# Patient Record
Sex: MALE | Race: WHITE | NOT HISPANIC OR LATINO | Employment: FULL TIME | URBAN - METROPOLITAN AREA
[De-identification: names, ages, dates, MRNs, and addresses within clinical notes are randomized per-mention and may not be internally consistent; named-entity substitution may affect disease eponyms.]

---

## 2023-11-06 ENCOUNTER — HOSPITAL ENCOUNTER (OUTPATIENT)
Dept: CARDIOLOGY | Facility: CLINIC | Age: 68
Discharge: HOME | End: 2023-11-06
Payer: MEDICARE

## 2023-11-06 VITALS
SYSTOLIC BLOOD PRESSURE: 120 MMHG | WEIGHT: 236 LBS | HEIGHT: 72 IN | DIASTOLIC BLOOD PRESSURE: 80 MMHG | BODY MASS INDEX: 31.97 KG/M2

## 2023-11-06 DIAGNOSIS — I48.91 UNSPECIFIED ATRIAL FIBRILLATION (MULTI): ICD-10-CM

## 2023-11-06 LAB
AORTIC VALVE MEAN GRADIENT: 3.6
AORTIC VALVE PEAK VELOCITY: 1.2
AV PEAK GRADIENT: 5.7
AVA (PEAK VEL): 2.61
AVA (VTI): 2.46
EJECTION FRACTION APICAL 4 CHAMBER: 54.8
EJECTION FRACTION: 56
LEFT ATRIUM VOLUME AREA LENGTH INDEX BSA: 31
LEFT VENTRICLE INTERNAL DIMENSION DIASTOLE: 4.53 (ref 3.5–6)
LEFT VENTRICULAR OUTFLOW TRACT DIAMETER: 2.11
MITRAL VALVE E/A RATIO: 2.51
RIGHT VENTRICLE FREE WALL PEAK S': 0.07
RIGHT VENTRICLE PEAK SYSTOLIC PRESSURE: 29.2
TRICUSPID ANNULAR PLANE SYSTOLIC EXCURSION: 19

## 2023-11-06 PROCEDURE — 93306 TTE W/DOPPLER COMPLETE: CPT | Performed by: INTERNAL MEDICINE

## 2023-11-06 PROCEDURE — 93306 TTE W/DOPPLER COMPLETE: CPT

## 2023-12-01 PROBLEM — K40.30 INCARCERATED RIGHT INGUINAL HERNIA: Status: ACTIVE | Noted: 2023-12-01

## 2023-12-01 PROBLEM — I10 HYPERTENSION: Status: ACTIVE | Noted: 2023-05-24

## 2023-12-01 PROBLEM — H93.299 ABNORMAL AUDITORY PERCEPTION: Status: ACTIVE | Noted: 2023-12-01

## 2023-12-01 PROBLEM — N43.3 HYDROCELE: Status: ACTIVE | Noted: 2023-12-01

## 2023-12-01 PROBLEM — N48.83 ACQUIRED BURIED PENIS: Status: ACTIVE | Noted: 2023-12-01

## 2023-12-01 PROBLEM — R94.39 ABNORMAL STRESS TEST: Status: ACTIVE | Noted: 2023-12-01

## 2023-12-01 PROBLEM — E78.5 HYPERLIPIDEMIA: Status: ACTIVE | Noted: 2023-07-13

## 2023-12-01 PROBLEM — H93.13 TINNITUS OF BOTH EARS: Status: ACTIVE | Noted: 2023-12-01

## 2023-12-01 PROBLEM — N50.89 SWELLING OF SCROTUM PRESENT ON EXAMINATION: Status: ACTIVE | Noted: 2023-12-01

## 2023-12-01 PROBLEM — M25.569 KNEE PAIN: Status: ACTIVE | Noted: 2023-12-01

## 2023-12-01 PROBLEM — I48.91 ATRIAL FIBRILLATION (MULTI): Status: ACTIVE | Noted: 2023-05-24

## 2023-12-01 PROBLEM — N18.30 STAGE 3 CHRONIC KIDNEY DISEASE (MULTI): Status: ACTIVE | Noted: 2023-05-24

## 2023-12-01 PROBLEM — Q55.20: Status: ACTIVE | Noted: 2023-12-01

## 2023-12-01 PROBLEM — D17.6 LIPOMA, SPERMATIC CORD: Status: ACTIVE | Noted: 2023-12-01

## 2023-12-01 PROBLEM — H90.3 SENSORINEURAL HEARING LOSS (SNHL) OF BOTH EARS: Status: ACTIVE | Noted: 2023-12-01

## 2023-12-01 PROBLEM — R00.0 TACHYCARDIA: Status: ACTIVE | Noted: 2023-12-01

## 2023-12-01 PROBLEM — E66.812 CLASS 2 OBESITY: Status: ACTIVE | Noted: 2023-05-24

## 2023-12-01 PROBLEM — E66.9 CLASS 2 OBESITY: Status: ACTIVE | Noted: 2023-05-24

## 2023-12-01 PROBLEM — R03.0 ELEVATED BP WITHOUT DIAGNOSIS OF HYPERTENSION: Status: ACTIVE | Noted: 2023-12-01

## 2023-12-01 PROBLEM — K40.31: Status: ACTIVE | Noted: 2023-12-01

## 2023-12-04 ENCOUNTER — OFFICE VISIT (OUTPATIENT)
Dept: CARDIOLOGY | Facility: CLINIC | Age: 68
End: 2023-12-04
Payer: MEDICARE

## 2023-12-04 VITALS
WEIGHT: 244 LBS | HEART RATE: 65 BPM | DIASTOLIC BLOOD PRESSURE: 80 MMHG | BODY MASS INDEX: 33.05 KG/M2 | HEIGHT: 72 IN | SYSTOLIC BLOOD PRESSURE: 124 MMHG | OXYGEN SATURATION: 98 %

## 2023-12-04 DIAGNOSIS — E66.9 CLASS 1 OBESITY WITHOUT SERIOUS COMORBIDITY WITH BODY MASS INDEX (BMI) OF 33.0 TO 33.9 IN ADULT, UNSPECIFIED OBESITY TYPE: ICD-10-CM

## 2023-12-04 DIAGNOSIS — I10 HYPERTENSION, UNSPECIFIED TYPE: ICD-10-CM

## 2023-12-04 DIAGNOSIS — I48.21 PERMANENT ATRIAL FIBRILLATION (MULTI): Primary | ICD-10-CM

## 2023-12-04 DIAGNOSIS — E78.00 PURE HYPERCHOLESTEROLEMIA: ICD-10-CM

## 2023-12-04 PROCEDURE — 1126F AMNT PAIN NOTED NONE PRSNT: CPT | Performed by: INTERNAL MEDICINE

## 2023-12-04 PROCEDURE — 99214 OFFICE O/P EST MOD 30 MIN: CPT | Performed by: INTERNAL MEDICINE

## 2023-12-04 PROCEDURE — 3079F DIAST BP 80-89 MM HG: CPT | Performed by: INTERNAL MEDICINE

## 2023-12-04 PROCEDURE — 1160F RVW MEDS BY RX/DR IN RCRD: CPT | Performed by: INTERNAL MEDICINE

## 2023-12-04 PROCEDURE — 3008F BODY MASS INDEX DOCD: CPT | Performed by: INTERNAL MEDICINE

## 2023-12-04 PROCEDURE — 1036F TOBACCO NON-USER: CPT | Performed by: INTERNAL MEDICINE

## 2023-12-04 PROCEDURE — 1159F MED LIST DOCD IN RCRD: CPT | Performed by: INTERNAL MEDICINE

## 2023-12-04 PROCEDURE — 93000 ELECTROCARDIOGRAM COMPLETE: CPT | Performed by: INTERNAL MEDICINE

## 2023-12-04 PROCEDURE — 3074F SYST BP LT 130 MM HG: CPT | Performed by: INTERNAL MEDICINE

## 2023-12-04 RX ORDER — RIVAROXABAN 15 MG/1
15 TABLET, FILM COATED ORAL DAILY
COMMUNITY
End: 2023-12-27 | Stop reason: SDUPTHER

## 2023-12-04 RX ORDER — AMLODIPINE BESYLATE 5 MG/1
1 TABLET ORAL DAILY
COMMUNITY
Start: 2021-07-08 | End: 2024-03-05 | Stop reason: SDUPTHER

## 2023-12-04 RX ORDER — METOPROLOL TARTRATE 50 MG/1
1 TABLET ORAL DAILY
COMMUNITY

## 2023-12-04 NOTE — ASSESSMENT & PLAN NOTE
6/17/21 Holter Afib with 3.1 sec, pause; 7/2/21 echocardiogram LVEF = 55%,, 7/2/21 stress thallium abnomral ST response with normal perfusion scan LEF = 60%; 12/8/23 echocardiogram LVEF = 60-65%, unremarkable valves and TR with normal RVSP,  MARIANNE cardioversion declined at 12/15/22visit

## 2023-12-04 NOTE — PROGRESS NOTES
Subjective  Leon Berg  is a 68 y.o. year old male who presents for F/U persistent atrial fibrillation.  No chest pain, no dyspnea, no palapitions, no edema    Blood pressure 124/80, pulse 65, height 1.829 m (6'), weight 111 kg (244 lb), SpO2 98 %.   Patient has no known allergies.  History reviewed. No pertinent past medical history.  Past Surgical History:   Procedure Laterality Date    HERNIA REPAIR  09/01/2021    Hernia Repair    OTHER SURGICAL HISTORY  05/26/2021    Colonoscopy     No family history on file.  @SOC    Current Outpatient Medications   Medication Sig Dispense Refill    amLODIPine (Norvasc) 5 mg tablet Take 1 tablet (5 mg) by mouth once daily.      metoprolol tartrate (Lopressor) 50 mg tablet Take 1 tablet by mouth once daily.      Xarelto 15 mg tablet Take 1 tablet (15 mg) by mouth once daily.       No current facility-administered medications for this visit.        ROS  Review of Systems   All other systems reviewed and are negative.      Physical Exam  Physical Exam  Constitutional:       Appearance: He is obese.   HENT:      Head: Normocephalic and atraumatic.   Eyes:      Extraocular Movements: Extraocular movements intact.      Pupils: Pupils are equal, round, and reactive to light.   Cardiovascular:      Rate and Rhythm: Rhythm irregular.      Heart sounds: S1 normal and S2 normal.   Abdominal:      Palpations: Abdomen is soft.   Neurological:      Mental Status: He is alert.   Psychiatric:         Mood and Affect: Mood normal.          EKG  Encounter Date: 12/04/23   ECG 12 Lead    Narrative    Agtrial fibrillatation at 65/min.,        Problem List Items Addressed This Visit       Atrial fibrillation (CMS/[facility]) - Primary     6/17/21 Holter Afib with 3.1 sec, pause; 7/2/21 echocardiogram LVEF = 55%,, 7/2/21 stress thallium abnomral ST response with normal perfusion scan LEF = 60%         Relevant Medications    metoprolol tartrate (Lopressor) 50 mg tablet    amLODIPine (Norvasc) 5 mg  tablet    Other Relevant Orders    Follow Up In Cardiology    Obesity    Hyperlipidemia    Hypertension    Relevant Orders    ECG 12 Lead (Completed)         No follow-ups on file.      Leon Acosta MD

## 2023-12-27 DIAGNOSIS — I48.21 PERMANENT ATRIAL FIBRILLATION (MULTI): Primary | ICD-10-CM

## 2024-03-05 ENCOUNTER — OFFICE VISIT (OUTPATIENT)
Dept: CARDIOLOGY | Facility: CLINIC | Age: 69
End: 2024-03-05
Payer: MEDICARE

## 2024-03-05 VITALS
OXYGEN SATURATION: 97 % | HEART RATE: 75 BPM | HEIGHT: 72 IN | DIASTOLIC BLOOD PRESSURE: 74 MMHG | BODY MASS INDEX: 32.37 KG/M2 | WEIGHT: 239 LBS | SYSTOLIC BLOOD PRESSURE: 124 MMHG

## 2024-03-05 DIAGNOSIS — I10 PRIMARY HYPERTENSION: Primary | ICD-10-CM

## 2024-03-05 DIAGNOSIS — I48.21 PERMANENT ATRIAL FIBRILLATION (MULTI): Primary | ICD-10-CM

## 2024-03-05 DIAGNOSIS — N18.30 STAGE 3 CHRONIC KIDNEY DISEASE, UNSPECIFIED WHETHER STAGE 3A OR 3B CKD (MULTI): ICD-10-CM

## 2024-03-05 DIAGNOSIS — E66.9 CLASS 1 OBESITY WITHOUT SERIOUS COMORBIDITY WITH BODY MASS INDEX (BMI) OF 32.0 TO 32.9 IN ADULT, UNSPECIFIED OBESITY TYPE: ICD-10-CM

## 2024-03-05 DIAGNOSIS — I10 PRIMARY HYPERTENSION: ICD-10-CM

## 2024-03-05 DIAGNOSIS — E78.00 PURE HYPERCHOLESTEROLEMIA: ICD-10-CM

## 2024-03-05 PROCEDURE — 1159F MED LIST DOCD IN RCRD: CPT | Performed by: INTERNAL MEDICINE

## 2024-03-05 PROCEDURE — 3008F BODY MASS INDEX DOCD: CPT | Performed by: INTERNAL MEDICINE

## 2024-03-05 PROCEDURE — 1160F RVW MEDS BY RX/DR IN RCRD: CPT | Performed by: INTERNAL MEDICINE

## 2024-03-05 PROCEDURE — 3074F SYST BP LT 130 MM HG: CPT | Performed by: INTERNAL MEDICINE

## 2024-03-05 PROCEDURE — 1036F TOBACCO NON-USER: CPT | Performed by: INTERNAL MEDICINE

## 2024-03-05 PROCEDURE — 1126F AMNT PAIN NOTED NONE PRSNT: CPT | Performed by: INTERNAL MEDICINE

## 2024-03-05 PROCEDURE — 93000 ELECTROCARDIOGRAM COMPLETE: CPT | Performed by: INTERNAL MEDICINE

## 2024-03-05 PROCEDURE — 99214 OFFICE O/P EST MOD 30 MIN: CPT | Performed by: INTERNAL MEDICINE

## 2024-03-05 PROCEDURE — 3078F DIAST BP <80 MM HG: CPT | Performed by: INTERNAL MEDICINE

## 2024-03-05 RX ORDER — AMLODIPINE BESYLATE 5 MG/1
5 TABLET ORAL DAILY
Qty: 90 TABLET | Refills: 3 | Status: SHIPPED | OUTPATIENT
Start: 2024-03-05

## 2024-03-05 NOTE — ASSESSMENT & PLAN NOTE
9/22 23 echocardiogram LVEF = 55-60%, unremarkable valves, 7/2/21 sress thallium abnormal ST response with normal scan LVEF = 60%, 6/17/21 Holter  Afib with 3.1 sec. ause

## 2024-03-05 NOTE — PROGRESS NOTES
Subjective  Leon Berg  is a 68 y.o. year old male who presents for Persistent atrial fibrillation.  No chest pain, no dyhspnea, no palpitation, no edema    Blood pressure 124/74, pulse 75, height 1.829 m (6'), weight 108 kg (239 lb), SpO2 97 %.   Patient has no known allergies.  History reviewed. No pertinent past medical history.  Past Surgical History:   Procedure Laterality Date    HERNIA REPAIR  09/01/2021    Hernia Repair    OTHER SURGICAL HISTORY  05/26/2021    Colonoscopy     No family history on file.  @SOC    Current Outpatient Medications   Medication Sig Dispense Refill    amLODIPine (Norvasc) 5 mg tablet Take 1 tablet (5 mg) by mouth once daily.      metoprolol tartrate (Lopressor) 50 mg tablet Take 1 tablet by mouth once daily.      rivaroxaban (Xarelto) 15 mg tablet Take 1 tablet (15 mg) by mouth once daily. 90 tablet 1     No current facility-administered medications for this visit.        ROS  Review of Systems   All other systems reviewed and are negative.      Physical Exam  Physical Exam  Constitutional:       Appearance: He is obese.   HENT:      Head: Normocephalic and atraumatic.   Eyes:      Extraocular Movements: Extraocular movements intact.      Pupils: Pupils are equal, round, and reactive to light.   Cardiovascular:      Rate and Rhythm: Normal rate. Rhythm irregularly irregular.      Heart sounds: S1 normal and S2 normal.   Abdominal:      Palpations: Abdomen is soft.   Musculoskeletal:      Right lower leg: No edema.      Left lower leg: No edema.   Skin:     General: Skin is warm and dry.   Neurological:      General: No focal deficit present.      Mental Status: He is alert and oriented to person, place, and time.   Psychiatric:         Mood and Affect: Mood normal.         Behavior: Behavior normal.          EKG  Encounter Date: 03/05/24   ECG 12 Lead    Narrative    Atrial fibrillation at 67/min.,       Problem List Items Addressed This Visit       Atrial fibrillation  (CMS/[facility]) - Primary     9/22 23 echocardiogram LVEF = 55-60%, unremarkable valves, 7/2/21 sress thallium abnormal ST response with normal scan LVEF = 60%, 6/17/21 Holter  Afib with 3.1 sec. ause         Relevant Orders    ECG 12 Lead (Completed)    Follow Up In Cardiology    Obesity    Hyperlipidemia    Hypertension    Stage 3 chronic kidney disease (CMS/[facility])         Same meds with recheck 3 months follow up      Leon Acosta MD

## 2024-06-11 ENCOUNTER — APPOINTMENT (OUTPATIENT)
Dept: CARDIOLOGY | Facility: CLINIC | Age: 69
End: 2024-06-11
Payer: MEDICARE

## 2024-06-20 ENCOUNTER — APPOINTMENT (OUTPATIENT)
Dept: CARDIOLOGY | Facility: CLINIC | Age: 69
End: 2024-06-20
Payer: MEDICARE

## 2024-06-25 DIAGNOSIS — I48.21 PERMANENT ATRIAL FIBRILLATION (MULTI): Primary | ICD-10-CM

## 2024-06-25 RX ORDER — RIVAROXABAN 15 MG/1
15 TABLET, FILM COATED ORAL DAILY
Qty: 90 TABLET | Refills: 3 | Status: SHIPPED | OUTPATIENT
Start: 2024-06-25

## 2024-07-18 PROBLEM — K40.31: Status: RESOLVED | Noted: 2023-12-01 | Resolved: 2024-07-18

## 2024-07-18 PROBLEM — N48.83 ACQUIRED BURIED PENIS: Status: RESOLVED | Noted: 2023-12-01 | Resolved: 2024-07-18

## 2024-07-18 PROBLEM — K40.30 INCARCERATED RIGHT INGUINAL HERNIA: Status: RESOLVED | Noted: 2023-12-01 | Resolved: 2024-07-18

## 2024-07-18 PROBLEM — Q55.20: Status: RESOLVED | Noted: 2023-12-01 | Resolved: 2024-07-18

## 2024-07-18 PROBLEM — D17.6 LIPOMA, SPERMATIC CORD: Status: RESOLVED | Noted: 2023-12-01 | Resolved: 2024-07-18

## 2024-07-18 PROBLEM — E66.811 CLASS 1 OBESITY WITH BODY MASS INDEX (BMI) OF 32.0 TO 32.9 IN ADULT: Status: ACTIVE | Noted: 2023-05-24

## 2024-07-18 PROBLEM — N43.3 HYDROCELE: Status: RESOLVED | Noted: 2023-12-01 | Resolved: 2024-07-18

## 2024-07-18 PROBLEM — N50.89 SWELLING OF SCROTUM PRESENT ON EXAMINATION: Status: RESOLVED | Noted: 2023-12-01 | Resolved: 2024-07-18

## 2024-08-05 ENCOUNTER — APPOINTMENT (OUTPATIENT)
Dept: CARDIOLOGY | Facility: CLINIC | Age: 69
End: 2024-08-05
Payer: MEDICARE

## 2024-08-05 VITALS
OXYGEN SATURATION: 97 % | HEIGHT: 72 IN | WEIGHT: 238 LBS | SYSTOLIC BLOOD PRESSURE: 120 MMHG | DIASTOLIC BLOOD PRESSURE: 72 MMHG | HEART RATE: 77 BPM | BODY MASS INDEX: 32.23 KG/M2

## 2024-08-05 DIAGNOSIS — E66.9 CLASS 1 OBESITY WITHOUT SERIOUS COMORBIDITY WITH BODY MASS INDEX (BMI) OF 32.0 TO 32.9 IN ADULT, UNSPECIFIED OBESITY TYPE: ICD-10-CM

## 2024-08-05 DIAGNOSIS — I10 PRIMARY HYPERTENSION: ICD-10-CM

## 2024-08-05 DIAGNOSIS — N18.30 STAGE 3 CHRONIC KIDNEY DISEASE, UNSPECIFIED WHETHER STAGE 3A OR 3B CKD (MULTI): ICD-10-CM

## 2024-08-05 DIAGNOSIS — I48.21 PERMANENT ATRIAL FIBRILLATION (MULTI): Primary | ICD-10-CM

## 2024-08-05 PROCEDURE — 1159F MED LIST DOCD IN RCRD: CPT | Performed by: INTERNAL MEDICINE

## 2024-08-05 PROCEDURE — 93010 ELECTROCARDIOGRAM REPORT: CPT | Performed by: INTERNAL MEDICINE

## 2024-08-05 PROCEDURE — 1160F RVW MEDS BY RX/DR IN RCRD: CPT | Performed by: INTERNAL MEDICINE

## 2024-08-05 PROCEDURE — 3074F SYST BP LT 130 MM HG: CPT | Performed by: INTERNAL MEDICINE

## 2024-08-05 PROCEDURE — 3078F DIAST BP <80 MM HG: CPT | Performed by: INTERNAL MEDICINE

## 2024-08-05 PROCEDURE — 1036F TOBACCO NON-USER: CPT | Performed by: INTERNAL MEDICINE

## 2024-08-05 PROCEDURE — 3008F BODY MASS INDEX DOCD: CPT | Performed by: INTERNAL MEDICINE

## 2024-08-05 PROCEDURE — 99214 OFFICE O/P EST MOD 30 MIN: CPT | Performed by: INTERNAL MEDICINE

## 2024-08-05 NOTE — ASSESSMENT & PLAN NOTE
9/22/23 echocardiogram LVEf = 55-60%, unremarkable valves. 7/21/21 stress thallium abnormal ST resposn with normal scan LVEF = 60%, 6/17/21 Holter afib with 3.1 sec. Pause.  Refuses cardioversion

## 2024-08-05 NOTE — PROGRESS NOTES
Subjective  Leon Berg  is a 68 y.o. year old male who presents for persistent atrial fibrillation.  No chest pain, no dyspnea, no palpitations, no edema    Blood pressure 120/72, pulse 77, height 1.829 m (6'), weight 108 kg (238 lb), SpO2 97%.   Patient has no known allergies.  History reviewed. No pertinent past medical history.  Past Surgical History:   Procedure Laterality Date    HERNIA REPAIR  09/01/2021    Hernia Repair    OTHER SURGICAL HISTORY  05/26/2021    Colonoscopy     No family history on file.  @SOC    Current Outpatient Medications   Medication Sig Dispense Refill    amLODIPine (Norvasc) 5 mg tablet Take 1 tablet (5 mg) by mouth once daily. 90 tablet 3    metoprolol tartrate (Lopressor) 50 mg tablet Take 1 tablet by mouth once daily.      Xarelto 15 mg tablet TAKE 1 TABLET(15 MG) BY MOUTH EVERY DAY 90 tablet 3     No current facility-administered medications for this visit.        ROS  Review of Systems   All other systems reviewed and are negative.      Physical Exam  Physical Exam  Constitutional:       Appearance: He is obese.   HENT:      Head: Normocephalic and atraumatic.   Cardiovascular:      Rate and Rhythm: Rhythm irregularly irregular.   Pulmonary:      Effort: Pulmonary effort is normal.      Breath sounds: Normal breath sounds.   Abdominal:      Palpations: Abdomen is soft.   Skin:     General: Skin is warm and dry.   Neurological:      General: No focal deficit present.      Mental Status: He is alert and oriented to person, place, and time.   Psychiatric:         Mood and Affect: Mood normal.         Behavior: Behavior normal.          EKG  Encounter Date: 03/05/24   ECG 12 Lead    Narrative    Atrial fibrillation at 67/min.,       Problem List Items Addressed This Visit       Atrial fibrillation (Multi) - Primary     9/22/23 echocardiogram LVEf = 55-60%, unremarkable valves. 7/21/21 stress thallium abnormal ST resposn with normal scan LVEF = 60%, 6/17/21 Holter afib with 3.1  sec. Pause.  Refuses cardioversion         Class 1 obesity with body mass index (BMI) of 32.0 to 32.9 in adult    Hypertension    Relevant Orders    ECG 12 Lead    Stage 3 chronic kidney disease (Multi)         Return 3 months  with echocardiogram      Leon Acosta MD

## 2024-08-27 DIAGNOSIS — I10 PRIMARY HYPERTENSION: Primary | ICD-10-CM

## 2024-08-27 RX ORDER — METOPROLOL SUCCINATE 50 MG/1
50 TABLET, EXTENDED RELEASE ORAL DAILY
COMMUNITY
End: 2024-08-27 | Stop reason: SDUPTHER

## 2024-08-27 RX ORDER — METOPROLOL SUCCINATE 50 MG/1
50 TABLET, EXTENDED RELEASE ORAL DAILY
Qty: 90 TABLET | Refills: 3 | Status: SHIPPED | OUTPATIENT
Start: 2024-08-27

## 2024-10-22 PROBLEM — M25.569 KNEE PAIN: Status: RESOLVED | Noted: 2023-12-01 | Resolved: 2024-10-22

## 2024-11-05 ENCOUNTER — HOSPITAL ENCOUNTER (OUTPATIENT)
Dept: CARDIOLOGY | Facility: CLINIC | Age: 69
Discharge: HOME | End: 2024-11-05
Payer: MEDICARE

## 2024-11-05 DIAGNOSIS — I48.21 PERMANENT ATRIAL FIBRILLATION (MULTI): ICD-10-CM

## 2024-11-05 DIAGNOSIS — I10 ESSENTIAL (PRIMARY) HYPERTENSION: ICD-10-CM

## 2024-11-05 LAB
AORTIC VALVE MEAN GRADIENT: 4 MMHG
AORTIC VALVE PEAK VELOCITY: 1.2 M/S
AV PEAK GRADIENT: 6 MMHG
AVA (PEAK VEL): 2.32 CM2
AVA (VTI): 2.75 CM2
EJECTION FRACTION APICAL 4 CHAMBER: 58.4
EJECTION FRACTION: 58 %
LEFT ATRIUM VOLUME AREA LENGTH INDEX BSA: 24.5 ML/M2
LEFT VENTRICLE INTERNAL DIMENSION DIASTOLE: 4.26 CM (ref 3.5–6)
LEFT VENTRICULAR OUTFLOW TRACT DIAMETER: 2 CM
RIGHT VENTRICLE FREE WALL PEAK S': 0.13 CM/S
RIGHT VENTRICLE PEAK SYSTOLIC PRESSURE: 27.5 MMHG
TRICUSPID ANNULAR PLANE SYSTOLIC EXCURSION: 2.4 CM

## 2024-11-05 PROCEDURE — 93306 TTE W/DOPPLER COMPLETE: CPT | Performed by: INTERNAL MEDICINE

## 2024-11-05 PROCEDURE — 93306 TTE W/DOPPLER COMPLETE: CPT

## 2024-11-11 ENCOUNTER — OFFICE VISIT (OUTPATIENT)
Dept: CARDIOLOGY | Facility: CLINIC | Age: 69
End: 2024-11-11
Payer: MEDICARE

## 2024-11-11 VITALS
SYSTOLIC BLOOD PRESSURE: 118 MMHG | HEART RATE: 78 BPM | WEIGHT: 241 LBS | HEIGHT: 72 IN | BODY MASS INDEX: 32.64 KG/M2 | DIASTOLIC BLOOD PRESSURE: 72 MMHG | OXYGEN SATURATION: 96 %

## 2024-11-11 DIAGNOSIS — I48.21 PERMANENT ATRIAL FIBRILLATION (MULTI): Primary | ICD-10-CM

## 2024-11-11 DIAGNOSIS — E78.00 PURE HYPERCHOLESTEROLEMIA: ICD-10-CM

## 2024-11-11 DIAGNOSIS — I10 PRIMARY HYPERTENSION: ICD-10-CM

## 2024-11-11 DIAGNOSIS — E66.811 CLASS 1 OBESITY WITHOUT SERIOUS COMORBIDITY WITH BODY MASS INDEX (BMI) OF 32.0 TO 32.9 IN ADULT, UNSPECIFIED OBESITY TYPE: ICD-10-CM

## 2024-11-11 DIAGNOSIS — N18.30 STAGE 3 CHRONIC KIDNEY DISEASE, UNSPECIFIED WHETHER STAGE 3A OR 3B CKD (MULTI): ICD-10-CM

## 2024-11-11 LAB
Q ONSET: 222 MS
QRS COUNT: 12 BEATS
QRS DURATION: 80 MS
QT INTERVAL: 370 MS
QTC CALCULATION(BAZETT): 393 MS
QTC FREDERICIA: 386 MS
R AXIS: 43 DEGREES
T AXIS: 26 DEGREES
T OFFSET: 407 MS
VENTRICULAR RATE: 68 BPM

## 2024-11-11 PROCEDURE — 93005 ELECTROCARDIOGRAM TRACING: CPT | Performed by: INTERNAL MEDICINE

## 2024-11-11 PROCEDURE — 1159F MED LIST DOCD IN RCRD: CPT | Performed by: INTERNAL MEDICINE

## 2024-11-11 PROCEDURE — 99214 OFFICE O/P EST MOD 30 MIN: CPT | Mod: 25 | Performed by: INTERNAL MEDICINE

## 2024-11-11 PROCEDURE — 3078F DIAST BP <80 MM HG: CPT | Performed by: INTERNAL MEDICINE

## 2024-11-11 PROCEDURE — 1160F RVW MEDS BY RX/DR IN RCRD: CPT | Performed by: INTERNAL MEDICINE

## 2024-11-11 PROCEDURE — 99214 OFFICE O/P EST MOD 30 MIN: CPT | Performed by: INTERNAL MEDICINE

## 2024-11-11 PROCEDURE — 1036F TOBACCO NON-USER: CPT | Performed by: INTERNAL MEDICINE

## 2024-11-11 PROCEDURE — 3074F SYST BP LT 130 MM HG: CPT | Performed by: INTERNAL MEDICINE

## 2024-11-11 PROCEDURE — 3008F BODY MASS INDEX DOCD: CPT | Performed by: INTERNAL MEDICINE

## 2024-11-11 PROCEDURE — 93010 ELECTROCARDIOGRAM REPORT: CPT | Performed by: INTERNAL MEDICINE

## 2024-11-11 NOTE — PROGRESS NOTES
Subjective  Leon Berg  is a 69 y.o. year old male who presents for F/U atrial fibrillation.  No chest pain, no dyspnea, no palpitations, no edema.    Blood pressure 118/72, pulse 78, height 1.829 m (6'), weight 109 kg (241 lb), SpO2 96%.   Patient has no known allergies.  History reviewed. No pertinent past medical history.  Past Surgical History:   Procedure Laterality Date    HERNIA REPAIR  09/01/2021    Hernia Repair    OTHER SURGICAL HISTORY  05/26/2021    Colonoscopy     No family history on file.  @SOC    Current Outpatient Medications   Medication Sig Dispense Refill    amLODIPine (Norvasc) 5 mg tablet Take 1 tablet (5 mg) by mouth once daily. 90 tablet 3    metoprolol succinate XL (Toprol-XL) 50 mg 24 hr tablet Take 1 tablet (50 mg) by mouth once daily. 90 tablet 3    Xarelto 15 mg tablet TAKE 1 TABLET(15 MG) BY MOUTH EVERY DAY 90 tablet 3     No current facility-administered medications for this visit.        ROS  Review of Systems   All other systems reviewed and are negative.      Physical Exam  Physical Exam  Constitutional:       Appearance: He is obese.   HENT:      Head: Normocephalic and atraumatic.   Cardiovascular:      Rate and Rhythm: Normal rate. Rhythm irregularly irregular.   Pulmonary:      Effort: Pulmonary effort is normal.      Breath sounds: Normal breath sounds.   Abdominal:      Palpations: Abdomen is soft.   Skin:     General: Skin is warm and dry.   Neurological:      General: No focal deficit present.      Mental Status: He is alert.   Psychiatric:         Mood and Affect: Mood normal.          EKG  Encounter Date: 08/05/24   ECG 12 Lead    Narrative    Atrial fibrillation       Problem List Items Addressed This Visit       Atrial fibrillation (Multi) - Primary     Refuses carioversion.  7/21/21 stress thallium abnormal ST response with normal scan LVEF = 60%. 11/5/24 echocardiogram  LVEf = 55-60%, mild LA, TR with normal RVSP, unremarkable valves         Relevant Orders     ECG 12 Lead    Class 1 obesity with body mass index (BMI) of 32.0 to 32.9 in adult    Hyperlipidemia    Hypertension    Stage 3 chronic kidney disease (Multi)         Same meds with return 3 months with EKG      Leon Acosta MD

## 2024-11-11 NOTE — ASSESSMENT & PLAN NOTE
Refuses carioversion.  7/21/21 stress thallium abnormal ST response with normal scan LVEF = 60%. 11/5/24 echocardiogram  LVEf = 55-60%, mild LA, TR with normal RVSP, unremarkable valves

## 2025-02-11 ENCOUNTER — OFFICE VISIT (OUTPATIENT)
Dept: CARDIOLOGY | Facility: CLINIC | Age: 70
End: 2025-02-11
Payer: MEDICARE

## 2025-02-11 VITALS
BODY MASS INDEX: 33.46 KG/M2 | HEART RATE: 74 BPM | DIASTOLIC BLOOD PRESSURE: 80 MMHG | HEIGHT: 72 IN | WEIGHT: 247 LBS | OXYGEN SATURATION: 98 % | SYSTOLIC BLOOD PRESSURE: 120 MMHG

## 2025-02-11 DIAGNOSIS — I10 PRIMARY HYPERTENSION: ICD-10-CM

## 2025-02-11 DIAGNOSIS — N18.30 STAGE 3 CHRONIC KIDNEY DISEASE, UNSPECIFIED WHETHER STAGE 3A OR 3B CKD (MULTI): ICD-10-CM

## 2025-02-11 DIAGNOSIS — E66.811 CLASS 1 OBESITY WITHOUT SERIOUS COMORBIDITY WITH BODY MASS INDEX (BMI) OF 32.0 TO 32.9 IN ADULT, UNSPECIFIED OBESITY TYPE: ICD-10-CM

## 2025-02-11 DIAGNOSIS — E78.00 PURE HYPERCHOLESTEROLEMIA: ICD-10-CM

## 2025-02-11 DIAGNOSIS — I48.21 PERMANENT ATRIAL FIBRILLATION (MULTI): ICD-10-CM

## 2025-02-11 DIAGNOSIS — I48.21 PERMANENT ATRIAL FIBRILLATION (MULTI): Primary | ICD-10-CM

## 2025-02-11 PROCEDURE — 3074F SYST BP LT 130 MM HG: CPT | Performed by: INTERNAL MEDICINE

## 2025-02-11 PROCEDURE — 93010 ELECTROCARDIOGRAM REPORT: CPT | Performed by: INTERNAL MEDICINE

## 2025-02-11 PROCEDURE — 99214 OFFICE O/P EST MOD 30 MIN: CPT | Performed by: INTERNAL MEDICINE

## 2025-02-11 PROCEDURE — 93005 ELECTROCARDIOGRAM TRACING: CPT | Performed by: INTERNAL MEDICINE

## 2025-02-11 PROCEDURE — 3079F DIAST BP 80-89 MM HG: CPT | Performed by: INTERNAL MEDICINE

## 2025-02-11 PROCEDURE — 3008F BODY MASS INDEX DOCD: CPT | Performed by: INTERNAL MEDICINE

## 2025-02-11 PROCEDURE — 99214 OFFICE O/P EST MOD 30 MIN: CPT | Mod: 25 | Performed by: INTERNAL MEDICINE

## 2025-02-11 NOTE — PROGRESS NOTES
Subjective  Leon Berg  is a 69 y.o. year old male who presents for paroxyaml atrial fibrillation.  Xarelto cost increased significantly.  No chest pain, no palpitatios, no edema.      Blood pressure 120/80, pulse 74, height 1.829 m (6'), weight 112 kg (247 lb), SpO2 98%.   Patient has no known allergies.  No past medical history on file.  Past Surgical History:   Procedure Laterality Date    HERNIA REPAIR  09/01/2021    Hernia Repair    OTHER SURGICAL HISTORY  05/26/2021    Colonoscopy     No family history on file.  @SOC    Current Outpatient Medications   Medication Sig Dispense Refill    amLODIPine (Norvasc) 5 mg tablet Take 1 tablet (5 mg) by mouth once daily. 90 tablet 3    metoprolol succinate XL (Toprol-XL) 50 mg 24 hr tablet Take 1 tablet (50 mg) by mouth once daily. 90 tablet 3    Xarelto 15 mg tablet TAKE 1 TABLET(15 MG) BY MOUTH EVERY DAY 90 tablet 3     No current facility-administered medications for this visit.        ROS  Review of Systems   All other systems reviewed and are negative.      Physical Exam  Physical Exam  Constitutional:       Appearance: He is obese.   HENT:      Head: Normocephalic and atraumatic.   Cardiovascular:      Rate and Rhythm: Normal rate. Rhythm irregularly irregular.   Pulmonary:      Effort: Pulmonary effort is normal.      Breath sounds: Normal breath sounds.   Abdominal:      Palpations: Abdomen is soft.   Musculoskeletal:      Right lower leg: No edema.      Left lower leg: No edema.   Skin:     General: Skin is warm and dry.   Neurological:      General: No focal deficit present.      Mental Status: He is alert and oriented to person, place, and time.   Psychiatric:         Mood and Affect: Mood normal.         Behavior: Behavior normal.          EKG  Encounter Date: 02/11/25   ECG 12 Lead   Result Value    Ventricular Rate 60    QRS Duration 84    QT Interval 390    QTC Calculation(Bazett) 390    R Axis 40    T Axis 39    QRS Count 10    Q Onset 221    T  Offset 416    QTC Fredericia 390    Narrative    Atrial fibrillation  Septal infarct (cited on or before 11-NOV-2024)  Abnormal ECG  When compared with ECG of 11-NOV-2024 10:26,  No significant change was found       Problem List Items Addressed This Visit       Atrial fibrillation (Multi) - Primary     Refuses cardioversion, 7/21/21 stress thallium abnormal ST response with normal scan and LVEF = 60%. 11/5/24 echocardiogram LVEf = 55-60%, mild LAE, Tr with normal RVSP, unremarkable valves         Relevant Orders    ECG 12 Lead (Completed)    Class 1 obesity with body mass index (BMI) of 32.0 to 32.9 in adult    Hyperlipidemia    Hypertension    Stage 3 chronic kidney disease (Multi)         Refer to specialty pharmacy  Return 3 months with EKG      Leon Acosta MD

## 2025-02-11 NOTE — ASSESSMENT & PLAN NOTE
Refuses cardioversion, 7/21/21 stress thallium abnormal ST response with normal scan and LVEF = 60%. 11/5/24 echocardiogram LVEf = 55-60%, mild LAE, Tr with normal RVSP, unremarkable valves

## 2025-02-12 ENCOUNTER — SPECIALTY PHARMACY (OUTPATIENT)
Dept: PHARMACY | Facility: CLINIC | Age: 70
End: 2025-02-12

## 2025-02-12 LAB
Q ONSET: 221 MS
QRS COUNT: 10 BEATS
QRS DURATION: 84 MS
QT INTERVAL: 390 MS
QTC CALCULATION(BAZETT): 390 MS
QTC FREDERICIA: 390 MS
R AXIS: 40 DEGREES
T AXIS: 39 DEGREES
T OFFSET: 416 MS
VENTRICULAR RATE: 60 BPM

## 2025-02-26 ENCOUNTER — SPECIALTY PHARMACY (OUTPATIENT)
Dept: PHARMACY | Facility: CLINIC | Age: 70
End: 2025-02-26

## 2025-02-26 ENCOUNTER — PHARMACY VISIT (OUTPATIENT)
Dept: PHARMACY | Facility: CLINIC | Age: 70
End: 2025-02-26
Payer: COMMERCIAL

## 2025-02-26 PROCEDURE — RXMED WILLOW AMBULATORY MEDICATION CHARGE

## 2025-05-08 ENCOUNTER — OFFICE VISIT (OUTPATIENT)
Dept: CARDIOLOGY | Facility: CLINIC | Age: 70
End: 2025-05-08
Payer: MEDICARE

## 2025-05-08 VITALS
SYSTOLIC BLOOD PRESSURE: 130 MMHG | BODY MASS INDEX: 33.72 KG/M2 | OXYGEN SATURATION: 97 % | HEART RATE: 78 BPM | DIASTOLIC BLOOD PRESSURE: 80 MMHG | WEIGHT: 249 LBS | HEIGHT: 72 IN

## 2025-05-08 DIAGNOSIS — I48.21 PERMANENT ATRIAL FIBRILLATION (MULTI): Primary | ICD-10-CM

## 2025-05-08 DIAGNOSIS — N18.30 STAGE 3 CHRONIC KIDNEY DISEASE, UNSPECIFIED WHETHER STAGE 3A OR 3B CKD (MULTI): ICD-10-CM

## 2025-05-08 DIAGNOSIS — E78.00 PURE HYPERCHOLESTEROLEMIA: ICD-10-CM

## 2025-05-08 DIAGNOSIS — I10 PRIMARY HYPERTENSION: ICD-10-CM

## 2025-05-08 DIAGNOSIS — E66.811 CLASS 1 OBESITY WITHOUT SERIOUS COMORBIDITY WITH BODY MASS INDEX (BMI) OF 33.0 TO 33.9 IN ADULT, UNSPECIFIED OBESITY TYPE: ICD-10-CM

## 2025-05-08 LAB
Q ONSET: 219 MS
QRS COUNT: 11 BEATS
QRS DURATION: 84 MS
QT INTERVAL: 382 MS
QTC CALCULATION(BAZETT): 403 MS
QTC FREDERICIA: 396 MS
R AXIS: 33 DEGREES
T AXIS: 41 DEGREES
T OFFSET: 410 MS
VENTRICULAR RATE: 67 BPM

## 2025-05-08 PROCEDURE — 1036F TOBACCO NON-USER: CPT | Performed by: INTERNAL MEDICINE

## 2025-05-08 PROCEDURE — 3075F SYST BP GE 130 - 139MM HG: CPT | Performed by: INTERNAL MEDICINE

## 2025-05-08 PROCEDURE — 1159F MED LIST DOCD IN RCRD: CPT | Performed by: INTERNAL MEDICINE

## 2025-05-08 PROCEDURE — 99214 OFFICE O/P EST MOD 30 MIN: CPT | Performed by: INTERNAL MEDICINE

## 2025-05-08 PROCEDURE — 93005 ELECTROCARDIOGRAM TRACING: CPT | Performed by: INTERNAL MEDICINE

## 2025-05-08 PROCEDURE — 93010 ELECTROCARDIOGRAM REPORT: CPT | Performed by: INTERNAL MEDICINE

## 2025-05-08 PROCEDURE — 1160F RVW MEDS BY RX/DR IN RCRD: CPT | Performed by: INTERNAL MEDICINE

## 2025-05-08 PROCEDURE — 3079F DIAST BP 80-89 MM HG: CPT | Performed by: INTERNAL MEDICINE

## 2025-05-08 PROCEDURE — 3008F BODY MASS INDEX DOCD: CPT | Performed by: INTERNAL MEDICINE

## 2025-05-08 NOTE — ASSESSMENT & PLAN NOTE
Refuses cardioversion, 7/21/21 stress thallium abnormal ST response with normal scan and LVEf = 60%, 11/5/24 echocardiogram LVEF = 55-60%, mild LAE, TR with normal RVSP

## 2025-05-08 NOTE — PROGRESS NOTES
Subjective  Leon Berg  is a 69 y.o. year old male who presents for F/U afib.  No chest pain, no dyspnea, no palpitations, no edema. Doing yard work.    Blood pressure 130/80, pulse 78, height 1.829 m (6'), weight 113 kg (249 lb), SpO2 97%.   Patient has no known allergies.  Medical History[1]  Surgical History[2]  Family History[3]  @SOC    Current Medications[4]     ROS  Review of Systems   All other systems reviewed and are negative.      Physical Exam  Physical Exam  Constitutional:       Appearance: He is obese.   HENT:      Head: Normocephalic and atraumatic.   Cardiovascular:      Rate and Rhythm: Normal rate. Rhythm irregular.   Pulmonary:      Effort: Pulmonary effort is normal.      Breath sounds: Normal breath sounds.   Abdominal:      General: Abdomen is flat.   Skin:     General: Skin is warm and dry.   Neurological:      General: No focal deficit present.      Mental Status: He is alert and oriented to person, place, and time.   Psychiatric:         Mood and Affect: Mood normal.         Behavior: Behavior normal.     scan     EKG  Encounter Date: 05/08/25   ECG 12 Lead   Result Value    Ventricular Rate 67    QRS Duration 84    QT Interval 382    QTC Calculation(Bazett) 403    R Axis 33    T Axis 41    QRS Count 11    Q Onset 219    T Offset 410    QTC Fredericia 396    Narrative    Atrial fibrillation  Abnormal ECG  When compared with ECG of 11-FEB-2025 10:44,  Criteria for Septal infarct are no longer Present       Problem List Items Addressed This Visit       Atrial fibrillation (Multi) - Primary    Refuses cardioversion, 7/21/21 stress thallium abnormal ST response with normal scan and LVEf = 60%, 11/5/24 echocardiogram LVEF = 55-60%, mild LAE, TR with normal RVSP         Class 1 obesity with body mass index (BMI) of 33.0 to 33.9 in adult    Hyperlipidemia    Hypertension    Relevant Orders    ECG 12 Lead (Completed)    Stage 3 chronic kidney disease (Multi)         Same meds  Return 3  months with EKG      Leon Acosta MD        [1] History reviewed. No pertinent past medical history.  [2]   Past Surgical History:  Procedure Laterality Date    HERNIA REPAIR  09/01/2021    Hernia Repair    OTHER SURGICAL HISTORY  05/26/2021    Colonoscopy   [3] No family history on file.  [4]   Current Outpatient Medications   Medication Sig Dispense Refill    amLODIPine (Norvasc) 5 mg tablet Take 1 tablet (5 mg) by mouth once daily. 90 tablet 3    metoprolol succinate XL (Toprol-XL) 50 mg 24 hr tablet Take 1 tablet (50 mg) by mouth once daily. 90 tablet 3    rivaroxaban (Xarelto) 15 mg tablet Take 1 tablet (15 mg) by mouth once daily. 90 tablet 3     No current facility-administered medications for this visit.

## 2025-05-21 ENCOUNTER — SPECIALTY PHARMACY (OUTPATIENT)
Dept: PHARMACY | Facility: CLINIC | Age: 70
End: 2025-05-21

## 2025-05-21 PROCEDURE — RXMED WILLOW AMBULATORY MEDICATION CHARGE

## 2025-05-27 ENCOUNTER — PHARMACY VISIT (OUTPATIENT)
Dept: PHARMACY | Facility: CLINIC | Age: 70
End: 2025-05-27
Payer: COMMERCIAL

## 2025-06-02 DIAGNOSIS — I10 PRIMARY HYPERTENSION: ICD-10-CM

## 2025-06-02 RX ORDER — AMLODIPINE BESYLATE 5 MG/1
5 TABLET ORAL DAILY
Qty: 90 TABLET | Refills: 3 | Status: SHIPPED | OUTPATIENT
Start: 2025-06-02

## 2025-06-03 ENCOUNTER — SPECIALTY PHARMACY (OUTPATIENT)
Dept: PHARMACY | Facility: CLINIC | Age: 70
End: 2025-06-03

## 2025-06-03 PROCEDURE — RXMED WILLOW AMBULATORY MEDICATION CHARGE

## 2025-06-05 ENCOUNTER — PHARMACY VISIT (OUTPATIENT)
Dept: PHARMACY | Facility: CLINIC | Age: 70
End: 2025-06-05
Payer: COMMERCIAL

## 2025-08-05 ENCOUNTER — OFFICE VISIT (OUTPATIENT)
Dept: CARDIOLOGY | Facility: CLINIC | Age: 70
End: 2025-08-05
Payer: MEDICARE

## 2025-08-05 VITALS
WEIGHT: 247 LBS | BODY MASS INDEX: 33.46 KG/M2 | OXYGEN SATURATION: 97 % | DIASTOLIC BLOOD PRESSURE: 82 MMHG | HEIGHT: 72 IN | SYSTOLIC BLOOD PRESSURE: 125 MMHG | HEART RATE: 68 BPM

## 2025-08-05 DIAGNOSIS — I48.21 PERMANENT ATRIAL FIBRILLATION (MULTI): Primary | ICD-10-CM

## 2025-08-05 DIAGNOSIS — I10 PRIMARY HYPERTENSION: ICD-10-CM

## 2025-08-05 DIAGNOSIS — E78.00 PURE HYPERCHOLESTEROLEMIA: ICD-10-CM

## 2025-08-05 DIAGNOSIS — E66.811 CLASS 1 OBESITY WITHOUT SERIOUS COMORBIDITY WITH BODY MASS INDEX (BMI) OF 33.0 TO 33.9 IN ADULT, UNSPECIFIED OBESITY TYPE: ICD-10-CM

## 2025-08-05 LAB
Q ONSET: 222 MS
QRS COUNT: 12 BEATS
QRS DURATION: 80 MS
QT INTERVAL: 370 MS
QTC CALCULATION(BAZETT): 405 MS
QTC FREDERICIA: 393 MS
R AXIS: 29 DEGREES
T AXIS: 33 DEGREES
T OFFSET: 407 MS
VENTRICULAR RATE: 72 BPM

## 2025-08-05 PROCEDURE — 1036F TOBACCO NON-USER: CPT | Performed by: INTERNAL MEDICINE

## 2025-08-05 PROCEDURE — 3008F BODY MASS INDEX DOCD: CPT | Performed by: INTERNAL MEDICINE

## 2025-08-05 PROCEDURE — 93005 ELECTROCARDIOGRAM TRACING: CPT | Performed by: INTERNAL MEDICINE

## 2025-08-05 PROCEDURE — 99214 OFFICE O/P EST MOD 30 MIN: CPT | Performed by: INTERNAL MEDICINE

## 2025-08-05 PROCEDURE — 93010 ELECTROCARDIOGRAM REPORT: CPT | Performed by: INTERNAL MEDICINE

## 2025-08-05 PROCEDURE — 1160F RVW MEDS BY RX/DR IN RCRD: CPT | Performed by: INTERNAL MEDICINE

## 2025-08-05 PROCEDURE — 3079F DIAST BP 80-89 MM HG: CPT | Performed by: INTERNAL MEDICINE

## 2025-08-05 PROCEDURE — 3074F SYST BP LT 130 MM HG: CPT | Performed by: INTERNAL MEDICINE

## 2025-08-05 PROCEDURE — 1159F MED LIST DOCD IN RCRD: CPT | Performed by: INTERNAL MEDICINE

## 2025-08-05 PROCEDURE — 99212 OFFICE O/P EST SF 10 MIN: CPT

## 2025-08-05 NOTE — ASSESSMENT & PLAN NOTE
Refuses cardioversion. 7/21/21 stress thallium abnormal St response with normal scan and LVEF = 60%. 11/5/24 echocardiogram LVEf = 55-60%, mild LAE tr with normal RVSP

## 2025-08-05 NOTE — PROGRESS NOTES
Subjective  Leon Berg  is a 69 y.o. year old male who presents for F/U chronic atrial fibrillation. A little more sedentary this summer. No chest pain, no dyspnea, no palpitations, no edema.  Working in the yard    Blood pressure 125/82, pulse 68, height 1.829 m (6'), weight 112 kg (247 lb), SpO2 97%.   Patient has no known allergies.  Medical History[1]  Surgical History[2]  Family History[3]  @SOC    Current Medications[4]     ROS  Review of Systems   All other systems reviewed and are negative.      Physical Exam  Physical Exam  Constitutional:       Appearance: He is obese.   HENT:      Head: Normocephalic and atraumatic.     Cardiovascular:      Rate and Rhythm: Normal rate. Rhythm irregularly irregular.   Pulmonary:      Effort: Pulmonary effort is normal.      Breath sounds: Normal breath sounds.   Abdominal:      Palpations: Abdomen is soft.     Musculoskeletal:      Right lower leg: No edema.      Left lower leg: No edema.     Skin:     General: Skin is warm and dry.     Neurological:      General: No focal deficit present.      Mental Status: He is alert and oriented to person, place, and time.     Psychiatric:         Mood and Affect: Mood normal.         Behavior: Behavior normal.          EKG  Encounter Date: 05/08/25   ECG 12 Lead   Result Value    Ventricular Rate 67    QRS Duration 84    QT Interval 382    QTC Calculation(Bazett) 403    R Axis 33    T Axis 41    QRS Count 11    Q Onset 219    T Offset 410    QTC Fredericia 396    Narrative    Atrial fibrillation  Abnormal ECG  When compared with ECG of 11-FEB-2025 10:44,  Criteria for Septal infarct are no longer Present  Confirmed by Leon Acosta ([id number]) on 5/8/2025 5:20:40 PM       Problem List Items Addressed This Visit       Atrial fibrillation (Multi) - Primary    Refuses cardioversion. 7/21/21 stress thallium abnormal St response with normal scan and LVEF = 60%. 11/5/24 echocardiogram LVEf = 55-60%, mild LAE tr with normal RVSP          Relevant Orders    ECG 12 Lead    Transthoracic echo (TTE) complete    Class 1 obesity with body mass index (BMI) of 33.0 to 33.9 in adult    Hyperlipidemia    Hypertension         Same meds  Return 3 months with echocardiogram few days prior to return      Leon Acosta MD        [1] History reviewed. No pertinent past medical history.  [2]   Past Surgical History:  Procedure Laterality Date    HERNIA REPAIR  09/01/2021    Hernia Repair    OTHER SURGICAL HISTORY  05/26/2021    Colonoscopy   [3] No family history on file.  [4]   Current Outpatient Medications   Medication Sig Dispense Refill    amLODIPine (Norvasc) 5 mg tablet Take 1 tablet (5 mg) by mouth once daily. 90 tablet 3    metoprolol succinate XL (Toprol-XL) 50 mg 24 hr tablet Take 1 tablet (50 mg) by mouth once daily. 90 tablet 3    rivaroxaban (Xarelto) 15 mg tablet Take 1 tablet (15 mg) by mouth once daily. 90 tablet 3     No current facility-administered medications for this visit.

## 2025-08-22 ENCOUNTER — SPECIALTY PHARMACY (OUTPATIENT)
Dept: PHARMACY | Facility: CLINIC | Age: 70
End: 2025-08-22

## 2025-08-22 PROCEDURE — RXMED WILLOW AMBULATORY MEDICATION CHARGE

## 2025-08-25 ENCOUNTER — PHARMACY VISIT (OUTPATIENT)
Dept: PHARMACY | Facility: CLINIC | Age: 70
End: 2025-08-25
Payer: COMMERCIAL

## 2025-08-25 DIAGNOSIS — I10 PRIMARY HYPERTENSION: ICD-10-CM

## 2025-08-25 RX ORDER — METOPROLOL SUCCINATE 50 MG/1
50 TABLET, EXTENDED RELEASE ORAL DAILY
Qty: 90 TABLET | Refills: 3 | Status: SHIPPED | OUTPATIENT
Start: 2025-08-25 | End: 2025-08-28 | Stop reason: SDUPTHER

## 2025-08-26 ENCOUNTER — SPECIALTY PHARMACY (OUTPATIENT)
Dept: PHARMACY | Facility: CLINIC | Age: 70
End: 2025-08-26

## 2025-08-27 DIAGNOSIS — I10 PRIMARY HYPERTENSION: ICD-10-CM

## 2025-08-28 RX ORDER — METOPROLOL SUCCINATE 50 MG/1
50 TABLET, EXTENDED RELEASE ORAL DAILY
Qty: 90 TABLET | Refills: 3 | Status: SHIPPED | OUTPATIENT
Start: 2025-08-28 | End: 2026-08-28

## 2025-09-04 ENCOUNTER — APPOINTMENT (OUTPATIENT)
Dept: CARDIOLOGY | Facility: CLINIC | Age: 70
End: 2025-09-04
Payer: MEDICARE